# Patient Record
(demographics unavailable — no encounter records)

---

## 2025-03-11 NOTE — HISTORY OF PRESENT ILLNESS
[FreeTextEntry1] : 36 yo presents to discuss contraceptive management.  Was counseled by Dr. Pedersen that OCPs may not be good option given elevated BMI.  She was considering Mirena IUD at that time but changed her mine and opted to start Drospierinone -Ethinyl Estradiol which was prescribed on 8/2024. Potassium levels were to be monitored.  Dr. Pedersen recommended she take continuous pills, complete 84 pills as prescribed, take a week off and then resume new pill pack. Reported BTB 12/16/24.  Potassium levels never checked.